# Patient Record
Sex: FEMALE | Race: WHITE | NOT HISPANIC OR LATINO | Employment: STUDENT | URBAN - METROPOLITAN AREA
[De-identification: names, ages, dates, MRNs, and addresses within clinical notes are randomized per-mention and may not be internally consistent; named-entity substitution may affect disease eponyms.]

---

## 2018-06-19 ENCOUNTER — OFFICE VISIT (OUTPATIENT)
Dept: GYNECOLOGY | Facility: CLINIC | Age: 23
End: 2018-06-19
Payer: COMMERCIAL

## 2018-06-19 VITALS
HEIGHT: 62 IN | SYSTOLIC BLOOD PRESSURE: 98 MMHG | WEIGHT: 116 LBS | DIASTOLIC BLOOD PRESSURE: 58 MMHG | BODY MASS INDEX: 21.35 KG/M2

## 2018-06-19 DIAGNOSIS — Z01.419 ENCOUNTER FOR ANNUAL ROUTINE GYNECOLOGICAL EXAMINATION: Primary | ICD-10-CM

## 2018-06-19 DIAGNOSIS — N91.1 AMENORRHEA, SECONDARY: ICD-10-CM

## 2018-06-19 PROCEDURE — S0610 ANNUAL GYNECOLOGICAL EXAMINA: HCPCS | Performed by: NURSE PRACTITIONER

## 2018-06-19 NOTE — PROGRESS NOTES
Subjective      Sierra Ruiz is a 26 y o  female who presents for her annual exam  Periods are absent for about 2 yrs  lasting 0 days  Dysmenorrhea:none  Cyclic symptoms include none  No intermenstrual bleeding, spotting, or discharge  The pt  relates that she exercises regularly and is involved in sports and so did stop having periods about 2 yrs  ago  Prior to that time her periods were not regular due to her involvement in sports activities  She has never been sexually active and so was not concerned  Also she states that she did lose 15 lbs  within the last year and been followed for this weight loss  Her weight is now stable at approx  115 lbs  She has completed the Gardasil vaccine series  Current contraception: none  History of abnormal Pap smear: no  Family history of breast cancer: no  History reviewed  No pertinent past medical history  Family History   Problem Relation Age of Onset   [place] Colla' disease Mother     Hypertension Father     Ovarian cancer Paternal Grandmother      Past Surgical History:   Procedure Laterality Date    WISDOM TOOTH EXTRACTION       Social History     Social History    Marital status: Single     Spouse name: N/A    Number of children: N/A    Years of education: N/A     Occupational History    Not on file  Social History Main Topics    Smoking status: Never Smoker    Smokeless tobacco: Never Used    Alcohol use Yes      Comment: RARE    Drug use: No    Sexual activity: No     Other Topics Concern    Not on file     Social History Narrative    No narrative on file     No current outpatient prescriptions on file        Review of Systems  Constitutional :no fever, feels well, no tiredness, no recent weight gain or loss  Cardiovascular: no complaints of slow or fast heart beat, no chest pain, no palpitations  Respiratory: no complaints of shortness of shortness of breath, no JOHN  Breasts:no complaints of breast pain, breast lump, or nipple discharge  Gastrointestinal: no complaints of abdominal pain, constipation,nause, vomiting, or diarrhea or bloody stools  Genitourinary : no complaints of dysuria, incontinence, pelvic pain, dysmenorrhea,vaginal discharge or abnormal vaginal bleeding  Reports amenorrhea  Integumentary: no complaints of skin rash or lesion,itching or dry skin  Neurological: no complaints of headache,numbness, tingling, dizziness or fainting       Objective      BP 98/58   Ht 5' 1 8" (1 57 m)   Wt 52 6 kg (116 lb)   BMI 21 35 kg/m²     General:   appears stated age","cooperative","alert" normal mood and affect   Neck: Neck: normal, supple,trachea midline, no masses   Heart: regular rate and rhythm, S1, S2 normal, no murmur, click, rub or gallop   Lungs: clear to auscultation bilaterally   Breasts: Breast exam :normal, no dimpling or skin changes noted   Abdomen: soft, non-tender, without masses or organomegaly   Vulva: Normal , no lesions   Vagina: normal , no lesions or dryness   Urethra: normal   Cervix: Normal, no palpable masses A pap smear was not done - pt  not sexually active   Uterus: Normal , non-tender,not enlarged,no palpable masses   Adnexa: Normal, non-tender without fullness or masses                         Assessment   Normal GYN exam  Amenorrhea       Plan      All questions answered  Breast self exam technique reviewed and patient encouraged to perform self-exam monthly  Follow up as needed  We discussed her amenorrhea, her BMI & recent weight loss and her exercise level  She is aware that these are contributing factors to her amenorrhea  We discussed hormonal options to help regulate her menses and to prevent endometrial hyperplasia  At this time she will have a pelvic US done 1st and then agrees to use 5 days of Provera once those results are available

## 2018-06-22 DIAGNOSIS — R10.2 PELVIC PAIN: ICD-10-CM

## 2018-06-22 DIAGNOSIS — N91.2 AMENORRHEA: Primary | ICD-10-CM

## 2019-06-26 NOTE — PROGRESS NOTES
Assessment/Plan:    Amenorrhea - will check TVU and labs as shown  Discussed use of OCP or provera to initiate bleed  Dangers of hyperplasia reviewed  Heart murmur - patient will follow with PCP for evaluation  Recommended monthly SBE and annual CBE  No pap collected today as patient has never been sexually active  Return to the office in one year for routine annual gyn exam or sooner PRN  Diagnoses and all orders for this visit:    Encounter for gynecological examination (general) (routine) with abnormal findings    Amenorrhea  -     Follicle stimulating hormone; Future  -     Luteinizing hormone; Future  -     Estradiol; Future  -     TSH, 3rd generation with Free T4 reflex; Future  -     Prolactin; Future  -     Testosterone, free, total; Future  -     US pelvis complete non OB; Future            Subjective:      Patient ID: Luciana Gonzalez is a 27 y o  female  This patient presents for routine annual gyn exam    Patient was last seen 1 year ago at which time she had not had a period in 2 years  She states she had a normal ultrasound at that time, and had no further follow up  Today she states she has still had no menses, so now it has been 3 years  She denies breast concerns, abn discharge, pelvic pain, bowel/bladder dysfunction  Patient has never been sexually active  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          The following portions of the patient's history were reviewed and updated as appropriate: allergies, current medications, past family history, past medical history, past social history, past surgical history and problem list     Review of Systems   Constitutional: Negative  HENT: Negative  Respiratory: Negative  Cardiovascular: Negative  Gastrointestinal: Negative  Endocrine: Negative  Genitourinary: Positive for menstrual problem   Negative for difficulty urinating, dyspareunia, dysuria, frequency, pelvic pain, urgency, vaginal bleeding, vaginal discharge and vaginal pain  Musculoskeletal: Negative  Skin: Negative  Neurological: Negative  Psychiatric/Behavioral: Negative  Objective:      /60   Ht 5' 2" (1 575 m)   Wt 62 1 kg (137 lb)   BMI 25 06 kg/m²          Physical Exam   Constitutional: She is oriented to person, place, and time  She appears well-developed and well-nourished  She is cooperative  HENT:   Head: Normocephalic and atraumatic  Neck: Normal range of motion  Neck supple  No thyroid mass and no thyromegaly present  Cardiovascular: Normal rate and regular rhythm  Murmur heard  Pulmonary/Chest: Effort normal and breath sounds normal  Right breast exhibits no inverted nipple, no mass, no nipple discharge, no skin change and no tenderness  Left breast exhibits no inverted nipple, no mass, no nipple discharge, no skin change and no tenderness  No breast tenderness or discharge  Breasts are symmetrical    Abdominal: Soft  Normal appearance and bowel sounds are normal  There is no hepatosplenomegaly  There is no tenderness  Hernia confirmed negative in the right inguinal area and confirmed negative in the left inguinal area  Genitourinary: Vagina normal and uterus normal  Rectal exam shows no external hemorrhoid  No breast tenderness or discharge  Pelvic exam was performed with patient supine  No labial fusion  There is no rash, tenderness, lesion or injury on the right labia  There is no rash, tenderness, lesion or injury on the left labia  Uterus is not deviated, not enlarged, not fixed and not tender  Cervix exhibits no motion tenderness, no discharge and no friability  Right adnexum displays no mass, no tenderness and no fullness  Left adnexum displays no mass, no tenderness and no fullness  No erythema, tenderness or bleeding in the vagina  No signs of injury around the vagina  No vaginal discharge found     Genitourinary Comments: Urethra normal without lesions  No bladder tenderness Musculoskeletal: Normal range of motion  Lymphadenopathy:     She has no axillary adenopathy  No inguinal adenopathy noted on the right or left side  Right: No inguinal adenopathy present  Left: No inguinal adenopathy present  Neurological: She is alert and oriented to person, place, and time  Skin: Skin is warm, dry and intact  Psychiatric: She has a normal mood and affect  Her speech is normal and behavior is normal  Cognition and memory are normal    Nursing note and vitals reviewed

## 2019-07-01 ENCOUNTER — ANNUAL EXAM (OUTPATIENT)
Dept: GYNECOLOGY | Facility: CLINIC | Age: 24
End: 2019-07-01
Payer: COMMERCIAL

## 2019-07-01 VITALS
DIASTOLIC BLOOD PRESSURE: 60 MMHG | WEIGHT: 137 LBS | BODY MASS INDEX: 25.21 KG/M2 | SYSTOLIC BLOOD PRESSURE: 100 MMHG | HEIGHT: 62 IN

## 2019-07-01 DIAGNOSIS — N91.2 AMENORRHEA: ICD-10-CM

## 2019-07-01 DIAGNOSIS — Z01.411 ENCOUNTER FOR GYNECOLOGICAL EXAMINATION (GENERAL) (ROUTINE) WITH ABNORMAL FINDINGS: Primary | ICD-10-CM

## 2019-07-01 PROCEDURE — S0612 ANNUAL GYNECOLOGICAL EXAMINA: HCPCS | Performed by: OBSTETRICS & GYNECOLOGY

## 2019-07-01 NOTE — PATIENT INSTRUCTIONS
Amenorrhea - will check TVU and labs as shown  Discussed use of OCP or provera to initiate bleed  Dangers of hyperplasia reviewed  Heart murmur - patient will follow with PCP for evaluation  Recommended monthly SBE and annual CBE  No pap collected today as patient has never been sexually active  Return to the office in one year for routine annual gyn exam or sooner PRN

## 2019-07-15 ENCOUNTER — TELEPHONE (OUTPATIENT)
Dept: GYNECOLOGY | Facility: CLINIC | Age: 24
End: 2019-07-15

## 2019-07-16 ENCOUNTER — TELEPHONE (OUTPATIENT)
Dept: GYNECOLOGY | Facility: CLINIC | Age: 24
End: 2019-07-16

## 2019-07-16 NOTE — TELEPHONE ENCOUNTER
Patient returned your call  Won't be available until after 5pm today,  but will be available tomorrow

## 2019-07-17 ENCOUNTER — TELEPHONE (OUTPATIENT)
Dept: GYNECOLOGY | Facility: CLINIC | Age: 24
End: 2019-07-17

## 2019-07-17 NOTE — TELEPHONE ENCOUNTER
Spoke with patient who stated she will be scheduling TVU and bloodwork within 2 weeks because of her work schedule

## 2019-08-15 ENCOUNTER — TELEPHONE (OUTPATIENT)
Dept: GYNECOLOGY | Facility: CLINIC | Age: 24
End: 2019-08-15

## 2019-08-15 NOTE — TELEPHONE ENCOUNTER
LM on VM to [facility] - once again checking to see if she had lab work drawn and TVU done